# Patient Record
Sex: MALE | URBAN - METROPOLITAN AREA
[De-identification: names, ages, dates, MRNs, and addresses within clinical notes are randomized per-mention and may not be internally consistent; named-entity substitution may affect disease eponyms.]

---

## 2019-03-27 ENCOUNTER — TELEPHONE (OUTPATIENT)
Dept: PALLIATIVE MEDICINE | Facility: CLINIC | Age: 64
End: 2019-03-27

## 2019-03-27 NOTE — LETTER
March 29, 2019    Lencho Snider  [address] [address]    Dear Lencho,           You have been referred to [facility]. We have been unable to contact you by telephone to schedule your appointment. Please call our clinic at [phone] to schedule this appointment.       Sincerely,        [facility] [place]

## 2019-03-27 NOTE — TELEPHONE ENCOUNTER
"3- at 11:14 AM    Incoming fax from the [facility] ([address], [place] [address]) for a Lumber KELSEY for chronic increased back pain. Per order \"Reason for request: [facility] facility does not provide the required service.\"               [facility] ([facility]) Phone Number: [phone]             [facility] ([facility]) Fax Number: [phone]    Routing to the Scheduling Coordinators.      Shabnam [place]  Patient Representative  [facility]  "

## 2019-03-29 NOTE — TELEPHONE ENCOUNTER
Phoned pt disconnected number. Sent letter.      Ira TSANG    [facility] [place]

## 2019-04-04 NOTE — TELEPHONE ENCOUNTER
Pre-screening Questions for Radiology Injections:    Injection to be done at which interventional clinic site? [facility]    Instruct patient to arrive as directed prior to the scheduled appointment time:    [facility] 30 minutes before      [place]: 30 minutes before; if IV needed 1 hour before     Pro[facility]    Procedure ordered? Lumbar Epidural Steroid Injection    What insurance would patient like us to bill for this procedure? [facility]      Worker's comp or MVA (motor vehicle accident) -Any injection DO NOT SCHEDULE and route to Liliana Yang.      [employer] insurance - For SI joint injections, DO NOT SCHEDULE and route Liliana Trey.       [employer] - Any injection besides hip/shoulder/knee joint DO NOT SCHEDULE and route to Liliana Trey. She will obtain PA and call pt back to schedule procedure or notify pt of denial.       [employer]-Route to Liliana for review        IF SCHEDULING IN [place] AND NEEDS A PA, IT IS OKAY TO SCHEDULE. [place] HANDLES THEIR OWN PA'S AFTER THE PATIENT IS SCHEDULED. PLEASE SCHEDULE AT LEAST 1 WEEK OUT SO A PA CAN BE OBTAINED.      Any chance of pregnancy? NO   If YES, do NOT schedule and route to RN pool    Is an  needed? No     Patient has a drive home? (mandatory) YES: ok    Is patient taking any blood thinners (plavix, coumadin, jantoven, warfarin, heparin, pradaxa or dabigatran )? No   If hold needed, do NOT schedule, route to RN pool     Is patient taking any aspirin products (includes Excedrin and Fiorinal)? Yes - Pt takes 81mg daily; instructed to hold 0 day(s) prior to procedure.      If more than 325mg/day do NOT schedule; route to RN pool     For CERVICAL procedures, hold all aspirin products for 6 days.     Tell pt that if aspirin product is not held for 6 days, the procedure WILL BE cancelled.      Does the patient have a bleeding or clotting disorder? No     If YES, okay to schedule AND route to RN nurse pool    For any  patients with platelet count <100, must be forwarded to provider    Is patient diabetic?  Yes  If YES, have them bring their glucometer.    Does patient have an active infection or treated for one within the past week? No     Is patient currently taking any antibiotics?  No     For patients on chronic, preventative, or prophylactic antibiotics, procedures may be scheduled.     For patients on antibiotics for active or recent infection:    Abel David Burton, [place]-antibiotic course must have been completed for 4 days    Is patient currently taking any steroid medications? (i.e. Prednisone, Medrol)  No     For patients on steroid medications:    Abel David Burton, [place]-steroid course must have been completed for 4 days    Reviewed with patient:  If you are started on any steroids or antibiotics between now and your appointment, you must contact us because the procedure may need to be cancelled.  Yes    Is patient actively being treated for cancer or immunocompromised? No  If YES, do NOT schedule and route to RN pool     Are you able to get on and off an exam table with minimal or no assistance? Yes  If NO, do NOT schedule and route to RN pool    Are you able to roll over and lay on your stomach with minimal or no assistance? Yes  If NO, do NOT schedule and route to RN pool     Any allergies to contrast dye, iodine, shellfish, or numbing and steroid medications? No  If YES, route to RN pool AND add allergy information to appointment notes    Allergies: Patient has no allergy information on record.      Has the patient had a flu shot or any other vaccinations within 7 days before or after the procedure.  No     Does patient have an MRI/CT?  YES: MRI  (SI joint, hip injections, lumbar sympathetic blocks, and stellate ganglion blocks do not require an MRI)    Was the MRI done w/in the last 3 years?  Yes    Was MRI done at [place]? Yes      If not[facility]       If [facility] was  not done at [facility] or [facility] do NOT schedule and route to nursing.  If pt has an imaging disc, the injection may be scheduled but pt has to bring disc to appt. If they show up w/out disc the injection cannot be done    Reminders (please tell patient if applicable):       Instructed pt to arrive 30 minutes early for IV start if this is for a cervical procedure, ALL sympathetic (stellate ganglion, hypogastric, or lumbar sympathetic block) and all sedation procedures (RFA, spinal cord stimulation trials).  Not Applicable   -IVs are not routinely placed for Dr. Batista cervical cases   -Dr. Looney: IVs for cervical ESIs and cervical TBDs (not CMBBs/facet inj)      If NPO for sedation, informed patient that it is okay to take medications with sips of water (except if they are to hold blood thinners).  Not Applicable   *DO take blood pressure medication if it is prescribed*      If this is for a cervical KELSEY, informed patient that aspirin needs to be held for 6 days.   Not Applicable      For all patients not having spinal cord stimulator (SCS) trials or radiofrequency ablations (RFAs), informed patient:    IV sedation is not provided for this procedure.  If you feel that an oral anti-anxiety medication is needed, you can discuss this further with your referring provider or primary care provider.  The Pain Clinic provider will discuss specifics of what the procedure includes at your appointment.  Most procedures last 10-20 minutes.  We use numbing medications to help with any discomfort during the procedure.  Not Applicable      Do not schedule procedures requiring IV placement in the first appointment of the day or first appointment after lunch. Do NOT schedule at 0745, 0815 or 1245.       For patients 85 or older we recommend having an adult stay w/ them for the remainder of the day.       Does the patient have any questions?  NO  Hermila Salinas  [facility]

## 2019-04-11 NOTE — TELEPHONE ENCOUNTER
Dr. Ivonne Montes the lumbar MRI report is included in the injection orders.  See media under [facility].  Will the report suffice or do you want the actual disc?    Luz Maria Acosta RN-BSN  [facility]

## 2019-04-11 NOTE — TELEPHONE ENCOUNTER
Ideally we would have the images to review prior to the procedure.  See if he can get a disc with the images or maybe the [facility] can [facility] the images to our radiology department.    I can proceed with injection with the report but approach and level is easier to ascertain with images.    Jensen Montes MD  [facility]

## 2019-04-15 NOTE — TELEPHONE ENCOUNTER
The injection has not been scheduled.  A message was sent to x-ray techs to see if they are able to push imaging into pacs.    Luz Maria Acosta RN-BSN  [facility]

## 2019-04-16 NOTE — TELEPHONE ENCOUNTER
Unable to obtain imaging into PACS     The actual MRI disc is needed in order to do the procedure.  There are 2 options:  1. Pt can bring the MRI disc w/ her to the injection  2. We can request to have the MRI mailed to us. Once we receive the disc the injection can be scheduled.    Called pt and left message relaying the above.  He is to call back and let us know what he wants to do to get the MRI disc.    Luz Maria Acosta RN-BSN  [facility]

## 2019-04-29 NOTE — TELEPHONE ENCOUNTER
Received call from patient who states that he has already had the injection.         Olga Muñiz    [facility]

## 2019-04-29 NOTE — TELEPHONE ENCOUNTER
Called pt again and left message informing him that we are still waiting to hear back on what he wants to do regarding the MRI disc.    Luz Maria Acosta RN-BSN  [facility]